# Patient Record
Sex: MALE | Race: WHITE | Employment: FULL TIME | URBAN - METROPOLITAN AREA
[De-identification: names, ages, dates, MRNs, and addresses within clinical notes are randomized per-mention and may not be internally consistent; named-entity substitution may affect disease eponyms.]

---

## 2017-03-10 ENCOUNTER — OFFICE VISIT (OUTPATIENT)
Dept: INTERNAL MEDICINE CLINIC | Facility: CLINIC | Age: 72
End: 2017-03-10

## 2017-03-10 VITALS
SYSTOLIC BLOOD PRESSURE: 136 MMHG | HEIGHT: 67 IN | RESPIRATION RATE: 12 BRPM | WEIGHT: 259 LBS | TEMPERATURE: 98 F | DIASTOLIC BLOOD PRESSURE: 74 MMHG | BODY MASS INDEX: 40.65 KG/M2 | HEART RATE: 73 BPM

## 2017-03-10 DIAGNOSIS — Z79.4 TYPE 2 DIABETES MELLITUS WITHOUT COMPLICATION, WITH LONG-TERM CURRENT USE OF INSULIN (HCC): ICD-10-CM

## 2017-03-10 DIAGNOSIS — I10 ESSENTIAL HYPERTENSION: ICD-10-CM

## 2017-03-10 DIAGNOSIS — E11.9 TYPE 2 DIABETES MELLITUS WITHOUT COMPLICATION, WITH LONG-TERM CURRENT USE OF INSULIN (HCC): ICD-10-CM

## 2017-03-10 DIAGNOSIS — Z01.818 PREOP GENERAL PHYSICAL EXAM: Primary | ICD-10-CM

## 2017-03-10 DIAGNOSIS — C78.7 LIVER METASTASES (HCC): ICD-10-CM

## 2017-03-10 PROCEDURE — G0463 HOSPITAL OUTPT CLINIC VISIT: HCPCS | Performed by: INTERNAL MEDICINE

## 2017-03-10 PROCEDURE — 99214 OFFICE O/P EST MOD 30 MIN: CPT | Performed by: INTERNAL MEDICINE

## 2017-03-10 PROCEDURE — 93005 ELECTROCARDIOGRAM TRACING: CPT | Performed by: INTERNAL MEDICINE

## 2017-03-10 PROCEDURE — 93010 ELECTROCARDIOGRAM REPORT: CPT | Performed by: INTERNAL MEDICINE

## 2017-03-10 NOTE — PROGRESS NOTES
HPI:    Patient ID: Lilo Barrientos. is a 73year old male.     HPI Comments: Patient presents today for preop medical clearance as requested by Dr Osiris Ybarra, interventional radiologist, for upcoming percutaneous thermal ablation therapy for his 2 iso CR Take 1 tablet by mouth 3 (three) times daily. Disp:  Rfl: 3   BD PEN NEEDLE GREGORY U/F 32G X 4 MM Does not apply Misc  Disp:  Rfl: 11   HUMALOG KWIKPEN 100 UNIT/ML Subcutaneous Solution Pen-injector Take 25 units at all meals per patient.   Disp:  Rfl: 6 Cardiovascular: Normal rate, regular rhythm and normal heart sounds. Exam reveals no gallop and no friction rub. No murmur heard. Pulmonary/Chest: Effort normal and breath sounds normal. No respiratory distress. He has no wheezes. He has no rales. grady and agreed to do so. 4. Essential hypertension  bp controlled. I told him he needs to continue his metoprolol ER even on the day of his procedure. No orders of the defined types were placed in this encounter.        Meds This Visit:  No prescr

## 2017-03-11 RX ORDER — METOPROLOL SUCCINATE 50 MG/1
TABLET, EXTENDED RELEASE ORAL
Qty: 90 TABLET | Refills: 0 | Status: SHIPPED | OUTPATIENT
Start: 2017-03-11 | End: 2017-06-14

## 2017-03-13 ENCOUNTER — TELEPHONE (OUTPATIENT)
Dept: INTERNAL MEDICINE CLINIC | Facility: CLINIC | Age: 72
End: 2017-03-13

## 2017-03-16 ENCOUNTER — OFFICE VISIT (OUTPATIENT)
Dept: INTERNAL MEDICINE CLINIC | Facility: CLINIC | Age: 72
End: 2017-03-16

## 2017-03-16 ENCOUNTER — APPOINTMENT (OUTPATIENT)
Dept: LAB | Age: 72
End: 2017-03-16
Attending: INTERNAL MEDICINE
Payer: MEDICARE

## 2017-03-16 VITALS
WEIGHT: 256 LBS | BODY MASS INDEX: 40.18 KG/M2 | TEMPERATURE: 96 F | HEART RATE: 73 BPM | HEIGHT: 67 IN | SYSTOLIC BLOOD PRESSURE: 134 MMHG | DIASTOLIC BLOOD PRESSURE: 76 MMHG

## 2017-03-16 DIAGNOSIS — L29.9 PRURITUS: ICD-10-CM

## 2017-03-16 DIAGNOSIS — L29.9 PRURITUS: Primary | ICD-10-CM

## 2017-03-16 DIAGNOSIS — R21 RASH AND NONSPECIFIC SKIN ERUPTION: ICD-10-CM

## 2017-03-16 PROCEDURE — 36415 COLL VENOUS BLD VENIPUNCTURE: CPT

## 2017-03-16 PROCEDURE — 80076 HEPATIC FUNCTION PANEL: CPT

## 2017-03-16 PROCEDURE — 99214 OFFICE O/P EST MOD 30 MIN: CPT | Performed by: INTERNAL MEDICINE

## 2017-03-16 PROCEDURE — G0463 HOSPITAL OUTPT CLINIC VISIT: HCPCS | Performed by: INTERNAL MEDICINE

## 2017-03-16 NOTE — PROGRESS NOTES
HPI:    Patient ID: Radha Cunningham. is a 73year old male. Rash  This is a new problem. The current episode started more than 1 month ago (2 mos). The problem is unchanged. The rash is diffuse. The rash is characterized by itchiness and dryness.  He w if having snack, take 2. Disp:  Rfl:    Vitamin D3 (VITAMIN D3) 1000 UNITS Oral Tab Take 2,000 Units by mouth daily. Disp:  Rfl:      Allergies:   Ace Inhibitors              Comment:Other reaction(s): STATINS-HMG-COA REDUCTASE             INHIBITORS  St

## 2017-03-18 RX ORDER — EZETIMIBE 10 MG/1
TABLET ORAL
Qty: 90 TABLET | Refills: 0 | Status: SHIPPED | OUTPATIENT
Start: 2017-03-18 | End: 2017-07-26

## 2017-03-20 ENCOUNTER — TELEPHONE (OUTPATIENT)
Dept: INTERNAL MEDICINE CLINIC | Facility: CLINIC | Age: 72
End: 2017-03-20

## 2017-03-20 NOTE — TELEPHONE ENCOUNTER
Per Luz Elena Jimenes need to fax the med clearance, labs and =EKG with tracing fax to [phone], pt surgery is on 03/24/2017.

## 2017-03-22 NOTE — TELEPHONE ENCOUNTER
S/W Vladimir at [facility], radiology advised I will fax medical clearance to her and confirmed sent. Phone call to Medical Records for EKG tracing. Information given to them. They will call back if / when they find it.  Call back from Herman fregoso in Medical R

## 2017-03-22 NOTE — TELEPHONE ENCOUNTER
Janina Jimenez called in stating she received a packet full of pt's clearance, but she is asking for pt's EKG tracing please. Same fax # as below.

## 2017-03-27 ENCOUNTER — TELEPHONE (OUTPATIENT)
Dept: INTERNAL MEDICINE CLINIC | Facility: CLINIC | Age: 72
End: 2017-03-27

## 2017-03-28 NOTE — TELEPHONE ENCOUNTER
PA for Ezetimibe (Zetia) 10 mg tab completed with [facility] via CMM response time 24-72 hours KEY [id number].

## 2017-03-30 NOTE — TELEPHONE ENCOUNTER
Plan states brand Zetia is on the formulary and is covered; pharmacy should re-process for the brand . [facility] pharmacy notified.

## 2017-06-14 RX ORDER — METOPROLOL SUCCINATE 50 MG/1
TABLET, EXTENDED RELEASE ORAL
Qty: 90 TABLET | Refills: 1 | Status: SHIPPED | OUTPATIENT
Start: 2017-06-14

## 2017-07-26 NOTE — TELEPHONE ENCOUNTER
Refill Protocol Appointment Criteria  · Appointment scheduled in the past 6 months or in the next 3 months  Recent Outpatient Visits            4 months ago [facility]    Braeden Madrigal MD    Office Visit    4 mon

## 2017-07-27 RX ORDER — EZETIMIBE 10 MG/1
10 TABLET ORAL
Qty: 90 TABLET | Refills: 0 | Status: SHIPPED | OUTPATIENT
Start: 2017-07-27

## 2017-11-07 ENCOUNTER — TELEPHONE (OUTPATIENT)
Dept: INTERNAL MEDICINE CLINIC | Facility: CLINIC | Age: 72
End: 2017-11-07

## 2017-11-07 NOTE — TELEPHONE ENCOUNTER
Patient's daughter is calling in - is currently with the hospice coordinator - has a sense of urgency for this as they cannot get the paperwork signed without consent from PCP. This is for evaluation. Please advise.

## 2017-11-07 NOTE — TELEPHONE ENCOUNTER
[address] [facility], [address] calling states to ask if Dr will follow pt with hospice orders. Response is needed today.  Please call back with verbal.

## 2017-11-07 NOTE — TELEPHONE ENCOUNTER
S/W patient's daughter, Jim Arriola who states the Hospice co-ordinator, Ben Story needs verbal confirmation that Dr. Hannah Dunne will continue to follow patient's care during Hospice.  S/w Dr. Airam Ellsworth, verbal confirmation rec'd that he will continue to follow pat

## 2021-02-02 DIAGNOSIS — Z23 NEED FOR VACCINATION: ICD-10-CM
